# Patient Record
Sex: FEMALE | Race: WHITE | NOT HISPANIC OR LATINO
[De-identification: names, ages, dates, MRNs, and addresses within clinical notes are randomized per-mention and may not be internally consistent; named-entity substitution may affect disease eponyms.]

---

## 2021-03-24 ENCOUNTER — APPOINTMENT (OUTPATIENT)
Dept: INTERNAL MEDICINE | Facility: CLINIC | Age: 81
End: 2021-03-24
Payer: MEDICARE

## 2021-03-24 VITALS
BODY MASS INDEX: 25.46 KG/M2 | HEART RATE: 81 BPM | SYSTOLIC BLOOD PRESSURE: 162 MMHG | DIASTOLIC BLOOD PRESSURE: 92 MMHG | HEIGHT: 64.5 IN | WEIGHT: 151 LBS

## 2021-03-24 VITALS — HEART RATE: 88 BPM | SYSTOLIC BLOOD PRESSURE: 160 MMHG | DIASTOLIC BLOOD PRESSURE: 90 MMHG

## 2021-03-24 DIAGNOSIS — Z86.19 PERSONAL HISTORY OF OTHER INFECTIOUS AND PARASITIC DISEASES: ICD-10-CM

## 2021-03-24 DIAGNOSIS — Z00.00 ENCOUNTER FOR GENERAL ADULT MEDICAL EXAMINATION W/OUT ABNORMAL FINDINGS: ICD-10-CM

## 2021-03-24 DIAGNOSIS — Z82.49 FAMILY HISTORY OF ISCHEMIC HEART DISEASE AND OTHER DISEASES OF THE CIRCULATORY SYSTEM: ICD-10-CM

## 2021-03-24 DIAGNOSIS — Z87.81 PERSONAL HISTORY OF (HEALED) TRAUMATIC FRACTURE: ICD-10-CM

## 2021-03-24 DIAGNOSIS — R31.29 OTHER MICROSCOPIC HEMATURIA: ICD-10-CM

## 2021-03-24 DIAGNOSIS — Z86.018 PERSONAL HISTORY OF OTHER BENIGN NEOPLASM: ICD-10-CM

## 2021-03-24 DIAGNOSIS — Z87.2 PERSONAL HISTORY OF DISEASES OF THE SKIN AND SUBCUTANEOUS TISSUE: ICD-10-CM

## 2021-03-24 PROCEDURE — 99204 OFFICE O/P NEW MOD 45 MIN: CPT | Mod: 25

## 2021-03-24 PROCEDURE — 36415 COLL VENOUS BLD VENIPUNCTURE: CPT

## 2021-03-25 PROBLEM — Z82.49 FAMILY HISTORY OF CHF (CONGESTIVE HEART FAILURE): Status: ACTIVE | Noted: 2021-03-25

## 2021-03-25 PROBLEM — Z87.81 HISTORY OF ARM FRACTURE: Status: RESOLVED | Noted: 2021-03-25 | Resolved: 2021-03-25

## 2021-03-25 PROBLEM — Z86.018 HISTORY OF FIBROADENOMA OF LEFT BREAST: Status: RESOLVED | Noted: 2021-03-25 | Resolved: 2021-03-25

## 2021-03-25 PROBLEM — Z87.2 HISTORY OF CYST OF BREAST: Status: RESOLVED | Noted: 2021-03-25 | Resolved: 2021-03-25

## 2021-03-25 PROBLEM — Z86.19 HISTORY OF HERPES ZOSTER: Status: RESOLVED | Noted: 2021-03-25 | Resolved: 2021-03-25

## 2021-03-25 PROBLEM — R31.29 MICROSCOPIC HEMATURIA: Status: ACTIVE | Noted: 2021-03-25

## 2021-03-25 RX ORDER — UBIQUINOL 100 MG
100 CAPSULE ORAL
Refills: 0 | Status: ACTIVE | COMMUNITY

## 2021-03-25 RX ORDER — CALCIUM CARBONATE 600 MG
TABLET ORAL
Refills: 0 | Status: ACTIVE | COMMUNITY

## 2021-03-25 RX ORDER — PSYLLIUM HUSK 0.4 G
CAPSULE ORAL
Refills: 0 | Status: ACTIVE | COMMUNITY

## 2021-03-25 RX ORDER — OMEGA-3/DHA/EPA/FISH OIL 300-1000MG
400 CAPSULE ORAL
Refills: 0 | Status: ACTIVE | COMMUNITY

## 2021-03-25 NOTE — REVIEW OF SYSTEMS
[Patient Intake Form Reviewed] : Patient intake form was reviewed [Negative] : Heme/Lymph [FreeTextEntry7] : bm qd [FreeTextEntry8] : nocturia x1/6 hrs

## 2021-03-25 NOTE — PLAN
[FreeTextEntry1] : Venipuncture performed by me in my exam room during this visit\par Further management and instructions to patient will follow results of pending studies. See "Result Communication" (pending).\par new mammo requisition\par ov f/u n/f 1 mo

## 2021-03-25 NOTE — PHYSICAL EXAM
[Well-Appearing] : well-appearing [Normal Outer Ear/Nose] : the outer ears and nose were normal in appearance [No Respiratory Distress] : no respiratory distress  [Clear to Auscultation] : lungs were clear to auscultation bilaterally [No Edema] : there was no peripheral edema [Soft] : abdomen soft [Non Tender] : non-tender [No HSM] : no HSM [Normal Bowel Sounds] : normal bowel sounds [No Spinal Tenderness] : no spinal tenderness [Grossly Normal Strength/Tone] : grossly normal strength/tone [No Skin Lesions] : no skin lesions [No Focal Deficits] : no focal deficits [Normal] : affect was normal and insight and judgment were intact [de-identified] : right medial pterigium; bilat mild cataracts [de-identified] : excellent dentition [de-identified] : No adenopathy; thyroid not palpable. [de-identified] : minimal left genu valgus but + harm ant-medial left knee with swelling and tightness with full rom extremes

## 2021-03-25 NOTE — DATA REVIEWED
SHUKRI met with the pt. And her  and son At bedside. The pt. Lives with her  and son in a 2 level home on the 2nd floor. There about 23-24 stairs to enter. The pt. Reports being independent prior to admission with adls, ambulation and driving. [FreeTextEntry1] : searched, printed and entered into record xray studies at [facility]

## 2021-03-25 NOTE — HISTORY OF PRESENT ILLNESS
[FreeTextEntry1] : Initial Internal Medicine evaluation at [facility]. Her long-standing Internist was either taken "a sabbatical" or has retired.\par  [de-identified] : Generallly healthy 82 y/o single female,  retired . She live with her sister. Current visit ppt by pain in left knee for several weeks - using a cane for support. Medical conditions and history detailed below

## 2021-03-25 NOTE — HEALTH RISK ASSESSMENT
[Good] : ~his/her~  mood as  good [No] : In the past 12 months have you used drugs other than those required for medical reasons? No [No falls in past year] : Patient reported no falls in the past year [0] : 2) Feeling down, depressed, or hopeless: Not at all (0) [Patient reported mammogram was normal] : Patient reported mammogram was normal [Patient reported PAP Smear was normal] : Patient reported PAP Smear was normal [Patient reported bone density results were abnormal] : Patient reported bone density results were abnormal [Patient reported colonoscopy was normal] : Patient reported colonoscopy was normal [HIV test declined] : HIV test declined [Hepatitis C test declined] : Hepatitis C test declined [None] : None [With Family] : lives with family [# of Members in Household ___] :  household currently consist of [unfilled] member(s) [Retired] : retired [Single] : single [# Of Children ___] : has [unfilled] children [Feels Safe at Home] : Feels safe at home [Fully functional (bathing, dressing, toileting, transferring, walking, feeding)] : Fully functional (bathing, dressing, toileting, transferring, walking, feeding) [Fully functional (using the telephone, shopping, preparing meals, housekeeping, doing laundry, using] : Fully functional and needs no help or supervision to perform IADLs (using the telephone, shopping, preparing meals, housekeeping, doing laundry, using transportation, managing medications and managing finances) [Reports normal functional visual acuity (ie: able to read med bottle)] : Reports normal functional visual acuity [FreeTextEntry1] : knee, health maintenance [] : No [Audit-CScore] : 0 [de-identified] : walking when knee doesn't hurt [de-identified] : prudent [Change in mental status noted] : No change in mental status noted [Language] : denies difficulty with language [Handling Complex Tasks] : denies difficulty handling complex tasks [Reports changes in hearing] : Reports no changes in hearing [Reports changes in vision] : Reports no changes in vision [Reports changes in dental health] : Reports no changes in dental health [MammogramDate] : 07/19 [MammogramComments] : ANDREA [PapSmearDate] : 07/19 [BoneDensityDate] : 12/17 [BoneDensityComments] : ANDREA [ColonoscopyDate] : 01/15 [ColonoscopyComments] : Dr. 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

## 2021-03-26 LAB
25(OH)D3 SERPL-MCNC: 53.2 NG/ML
ALBUMIN SERPL ELPH-MCNC: 4.5 G/DL
ALP BLD-CCNC: 83 U/L
ALT SERPL-CCNC: 24 U/L
ANION GAP SERPL CALC-SCNC: 15 MMOL/L
AST SERPL-CCNC: 20 U/L
BASOPHILS # BLD AUTO: 0.02 K/UL
BASOPHILS NFR BLD AUTO: 0.3 %
BILIRUB SERPL-MCNC: 0.4 MG/DL
BUN SERPL-MCNC: 13 MG/DL
CALCIUM SERPL-MCNC: 9.9 MG/DL
CHLORIDE SERPL-SCNC: 105 MMOL/L
CHOLEST SERPL-MCNC: 214 MG/DL
CK SERPL-CCNC: 50 U/L
CO2 SERPL-SCNC: 22 MMOL/L
CREAT SERPL-MCNC: 0.65 MG/DL
EOSINOPHIL # BLD AUTO: 0.1 K/UL
EOSINOPHIL NFR BLD AUTO: 1.7 %
ERYTHROCYTE [SEDIMENTATION RATE] IN BLOOD BY WESTERGREN METHOD: 14 MM/HR
ESTIMATED AVERAGE GLUCOSE: 105 MG/DL
FERRITIN SERPL-MCNC: 328 NG/ML
FOLATE SERPL-MCNC: 10 NG/ML
GLUCOSE SERPL-MCNC: 119 MG/DL
HBA1C MFR BLD HPLC: 5.3 %
HCT VFR BLD CALC: 40.6 %
HDLC SERPL-MCNC: 61 MG/DL
HGB BLD-MCNC: 13.5 G/DL
IMM GRANULOCYTES NFR BLD AUTO: 0.3 %
IRON SATN MFR SERPL: 24 %
IRON SERPL-MCNC: 67 UG/DL
LDLC SERPL CALC-MCNC: 107 MG/DL
LYMPHOCYTES # BLD AUTO: 0.95 K/UL
LYMPHOCYTES NFR BLD AUTO: 16.6 %
MAN DIFF?: NORMAL
MCHC RBC-ENTMCNC: 30.7 PG
MCHC RBC-ENTMCNC: 33.3 GM/DL
MCV RBC AUTO: 92.3 FL
MONOCYTES # BLD AUTO: 0.41 K/UL
MONOCYTES NFR BLD AUTO: 7.2 %
NEUTROPHILS # BLD AUTO: 4.23 K/UL
NEUTROPHILS NFR BLD AUTO: 73.9 %
NONHDLC SERPL-MCNC: 153 MG/DL
PLATELET # BLD AUTO: 253 K/UL
POTASSIUM SERPL-SCNC: 3.8 MMOL/L
PROT SERPL-MCNC: 7 G/DL
RBC # BLD: 4.4 M/UL
RBC # FLD: 12.2 %
SODIUM SERPL-SCNC: 142 MMOL/L
T4 FREE SERPL-MCNC: 1.4 NG/DL
TIBC SERPL-MCNC: 275 UG/DL
TRIGL SERPL-MCNC: 226 MG/DL
TSH SERPL-ACNC: 1.52 UIU/ML
UIBC SERPL-MCNC: 209 UG/DL
URATE SERPL-MCNC: 4.7 MG/DL
VIT B12 SERPL-MCNC: 870 PG/ML
WBC # FLD AUTO: 5.73 K/UL

## 2021-06-02 ENCOUNTER — APPOINTMENT (OUTPATIENT)
Dept: INTERNAL MEDICINE | Facility: CLINIC | Age: 81
End: 2021-06-02
Payer: MEDICARE

## 2021-06-02 ENCOUNTER — NON-APPOINTMENT (OUTPATIENT)
Age: 81
End: 2021-06-02

## 2021-06-02 VITALS — DIASTOLIC BLOOD PRESSURE: 64 MMHG | HEART RATE: 59 BPM | SYSTOLIC BLOOD PRESSURE: 143 MMHG

## 2021-06-02 VITALS
DIASTOLIC BLOOD PRESSURE: 75 MMHG | HEART RATE: 63 BPM | BODY MASS INDEX: 25.69 KG/M2 | WEIGHT: 152 LBS | SYSTOLIC BLOOD PRESSURE: 134 MMHG

## 2021-06-02 DIAGNOSIS — I10 ESSENTIAL (PRIMARY) HYPERTENSION: ICD-10-CM

## 2021-06-02 DIAGNOSIS — M81.0 AGE-RELATED OSTEOPOROSIS W/OUT CURRENT PATHOLOGICAL FRACTURE: ICD-10-CM

## 2021-06-02 DIAGNOSIS — Z92.29 PERSONAL HISTORY OF OTHER DRUG THERAPY: ICD-10-CM

## 2021-06-02 DIAGNOSIS — M23.92 UNSPECIFIED INTERNAL DERANGEMENT OF LEFT KNEE: ICD-10-CM

## 2021-06-02 PROCEDURE — 99214 OFFICE O/P EST MOD 30 MIN: CPT

## 2021-06-02 RX ORDER — MELOXICAM 15 MG/1
15 TABLET ORAL
Qty: 90 | Refills: 3 | Status: ACTIVE | COMMUNITY
Start: 2021-03-25 | End: 1900-01-01

## 2021-06-02 RX ORDER — HYDROCHLOROTHIAZIDE 25 MG/1
25 TABLET ORAL
Qty: 90 | Refills: 3 | Status: ACTIVE | COMMUNITY
Start: 2021-03-25 | End: 1900-01-01

## 2021-06-03 PROBLEM — M81.0 OSTEOPOROSIS, POSTMENOPAUSAL: Status: ACTIVE | Noted: 2021-03-25

## 2021-06-03 PROBLEM — M23.92 DERANGEMENT OF LEFT KNEE: Status: ACTIVE | Noted: 2021-03-24

## 2021-06-03 PROBLEM — Z92.29 COVID-19 VACCINE SERIES COMPLETED: Status: ACTIVE | Noted: 2021-06-03

## 2021-06-03 NOTE — HISTORY OF PRESENT ILLNESS
[FreeTextEntry1] : medical f/u [de-identified] : 1. Knee improved, but still has residual discomfort at times. Better with walking and at night. Mobic, ice, and biofreeze helped.\par 2. HTN - did take daily HCTZ but has stopped\par 3. Had GYN f/u - mammo normal\par 4. Got a bone density - [facility] - has Rx for Boniva - instructed in proper use\par 5. Covid vaccine  completed

## 2021-06-03 NOTE — PHYSICAL EXAM
[Well-Appearing] : well-appearing [Normal Sclera/Conjunctiva] : normal sclera/conjunctiva [No Respiratory Distress] : no respiratory distress  [Clear to Auscultation] : lungs were clear to auscultation bilaterally [Normal] : normal rate, regular rhythm, normal S1 and S2 and no murmur heard [Soft] : abdomen soft [No HSM] : no HSM [Grossly Normal Strength/Tone] : grossly normal strength/tone [No Skin Lesions] : no skin lesions [No Focal Deficits] : no focal deficits [de-identified] : trace left edema; some prominent superficial veins [de-identified] : sl warmth and fullness lower lateral left knee with intact ligamental tetsing

## 2021-06-03 NOTE — PLAN
[FreeTextEntry1] : as per assessment above. Is leaving for 6+month in [facility]. Has prescriptions and records for medical care there

## 2021-10-06 PROBLEM — I10 ESSENTIAL HYPERTENSION: Status: ACTIVE | Noted: 2021-03-24
